# Patient Record
Sex: FEMALE | Race: WHITE | NOT HISPANIC OR LATINO | Employment: UNEMPLOYED
[De-identification: names, ages, dates, MRNs, and addresses within clinical notes are randomized per-mention and may not be internally consistent; named-entity substitution may affect disease eponyms.]

---

## 2022-07-30 ENCOUNTER — TELEPHONE ENCOUNTER (OUTPATIENT)
Age: 72
End: 2022-07-30

## 2022-07-31 ENCOUNTER — TELEPHONE ENCOUNTER (OUTPATIENT)
Age: 72
End: 2022-07-31

## 2023-01-09 ENCOUNTER — OFFICE VISIT (OUTPATIENT)
Dept: URBAN - METROPOLITAN AREA CLINIC 9 | Facility: CLINIC | Age: 73
End: 2023-01-09
Payer: MEDICARE

## 2023-01-09 ENCOUNTER — WEB ENCOUNTER (OUTPATIENT)
Dept: URBAN - METROPOLITAN AREA CLINIC 9 | Facility: CLINIC | Age: 73
End: 2023-01-09

## 2023-01-09 VITALS
DIASTOLIC BLOOD PRESSURE: 80 MMHG | SYSTOLIC BLOOD PRESSURE: 140 MMHG | BODY MASS INDEX: 23.78 KG/M2 | WEIGHT: 148 LBS | HEIGHT: 66 IN

## 2023-01-09 DIAGNOSIS — R19.7 DIARRHEA, UNSPECIFIED TYPE: ICD-10-CM

## 2023-01-09 DIAGNOSIS — R10.84 GENERALIZED ABDOMINAL PAIN: ICD-10-CM

## 2023-01-09 DIAGNOSIS — R19.4 CHANGE IN BOWEL HABITS: ICD-10-CM

## 2023-01-09 DIAGNOSIS — R63.4 WEIGHT LOSS: ICD-10-CM

## 2023-01-09 PROCEDURE — 99244 OFF/OP CNSLTJ NEW/EST MOD 40: CPT | Performed by: STUDENT IN AN ORGANIZED HEALTH CARE EDUCATION/TRAINING PROGRAM

## 2023-01-09 PROCEDURE — 99204 OFFICE O/P NEW MOD 45 MIN: CPT | Performed by: STUDENT IN AN ORGANIZED HEALTH CARE EDUCATION/TRAINING PROGRAM

## 2023-01-09 RX ORDER — ATENOLOL 50 MG/1
TABLET ORAL
Qty: 90 TABLET | Status: ACTIVE | COMMUNITY

## 2023-01-09 RX ORDER — OXYCODONE AND ACETAMINOPHEN 5; 325 MG/1; MG/1
TAKE ONE TABLET BY MOUTH TWICE A DAY TABLET ORAL
Qty: 60 UNSPECIFIED | Refills: 0 | Status: ACTIVE | COMMUNITY

## 2023-01-09 RX ORDER — ROSUVASTATIN CALCIUM 10 MG/1
TABLET, FILM COATED ORAL
Qty: 90 TABLET | Status: ACTIVE | COMMUNITY

## 2023-01-09 NOTE — HPI-TODAY'S VISIT:
Patient has a history of HTN, HLD who presents for loose stool  GI Hx: 10/2021- started having loose stools.  Can have 3-4 BM/day, semiformed. No blood, mucous, oily apperance. Has intermittent perioids without symptoms.  Crampy discomfort when needs to go to BM, improved after defecation. Weight loss 20 lbs- 1 year.  Denies fever, chills, nausea, vomiting. Denies dysphagia, reflux, UGI symptoms. Denies hematemesis, melena, hematochezia, blood per rectum.  Family hx: sister/mother- no diagnosis, but same symptoms  EGD: None  Colonoscopy: 2004- IH, sigmoid divertic, 2mm sigmoid polyp (path: HP), 5mm sigmoid polyp (path: HP), TI normal. Cologuard in 10/2021- normal.  Imaging/Studies/Procedures: 11/28/22- labs- CMP, CBC with Hgb 15.5 (), A1c, TSH 4.8 (H), Vit D 61,

## 2023-01-11 ENCOUNTER — TELEPHONE ENCOUNTER (OUTPATIENT)
Dept: URBAN - METROPOLITAN AREA CLINIC 7 | Facility: CLINIC | Age: 73
End: 2023-01-11

## 2023-01-17 ENCOUNTER — CLAIMS CREATED FROM THE CLAIM WINDOW (OUTPATIENT)
Dept: URBAN - METROPOLITAN AREA CLINIC 4 | Facility: CLINIC | Age: 73
End: 2023-01-17
Payer: MEDICARE

## 2023-01-17 ENCOUNTER — OFFICE VISIT (OUTPATIENT)
Dept: URBAN - METROPOLITAN AREA SURGERY CENTER 9 | Facility: SURGERY CENTER | Age: 73
End: 2023-01-17
Payer: MEDICARE

## 2023-01-17 ENCOUNTER — OFFICE VISIT (OUTPATIENT)
Dept: URBAN - METROPOLITAN AREA SURGERY CENTER 9 | Facility: SURGERY CENTER | Age: 73
End: 2023-01-17

## 2023-01-17 DIAGNOSIS — K63.5 BENIGN COLON POLYP: ICD-10-CM

## 2023-01-17 DIAGNOSIS — K64.0 BLEEDING GRADE I HEMORRHOIDS: ICD-10-CM

## 2023-01-17 DIAGNOSIS — K57.30 ACQUIRED DIVERTICULOSIS OF COLON: ICD-10-CM

## 2023-01-17 DIAGNOSIS — R19.7 ACUTE DIARRHEA: ICD-10-CM

## 2023-01-17 DIAGNOSIS — K52.832 LYMPHOCYTIC COLITIS: ICD-10-CM

## 2023-01-17 PROCEDURE — 88313 SPECIAL STAINS GROUP 2: CPT | Performed by: PATHOLOGY

## 2023-01-17 PROCEDURE — 88305 TISSUE EXAM BY PATHOLOGIST: CPT | Performed by: PATHOLOGY

## 2023-01-17 PROCEDURE — 45385 COLONOSCOPY W/LESION REMOVAL: CPT | Performed by: CLINIC/CENTER

## 2023-01-17 PROCEDURE — 45385 COLONOSCOPY W/LESION REMOVAL: CPT | Performed by: STUDENT IN AN ORGANIZED HEALTH CARE EDUCATION/TRAINING PROGRAM

## 2023-01-17 PROCEDURE — 45380 COLONOSCOPY AND BIOPSY: CPT | Performed by: CLINIC/CENTER

## 2023-01-17 PROCEDURE — 45380 COLONOSCOPY AND BIOPSY: CPT | Performed by: STUDENT IN AN ORGANIZED HEALTH CARE EDUCATION/TRAINING PROGRAM

## 2023-01-17 PROCEDURE — 88342 IMHCHEM/IMCYTCHM 1ST ANTB: CPT | Performed by: PATHOLOGY

## 2023-01-17 RX ORDER — OXYCODONE AND ACETAMINOPHEN 5; 325 MG/1; MG/1
TAKE ONE TABLET BY MOUTH TWICE A DAY TABLET ORAL
Qty: 60 UNSPECIFIED | Refills: 0 | COMMUNITY

## 2023-01-17 RX ORDER — OXYCODONE AND ACETAMINOPHEN 5; 325 MG/1; MG/1
TAKE ONE TABLET BY MOUTH TWICE A DAY TABLET ORAL
Qty: 60 UNSPECIFIED | Refills: 0 | Status: ACTIVE | COMMUNITY

## 2023-01-17 RX ORDER — ATENOLOL 50 MG/1
TABLET ORAL
Qty: 90 TABLET | COMMUNITY

## 2023-01-17 RX ORDER — ROSUVASTATIN CALCIUM 10 MG/1
TABLET, FILM COATED ORAL
Qty: 90 TABLET | Status: ACTIVE | COMMUNITY

## 2023-01-17 RX ORDER — ROSUVASTATIN CALCIUM 10 MG/1
TABLET, FILM COATED ORAL
Qty: 90 TABLET | COMMUNITY

## 2023-01-17 RX ORDER — ATENOLOL 50 MG/1
TABLET ORAL
Qty: 90 TABLET | Status: ACTIVE | COMMUNITY

## 2023-01-30 ENCOUNTER — TELEPHONE ENCOUNTER (OUTPATIENT)
Dept: URBAN - METROPOLITAN AREA CLINIC 7 | Facility: CLINIC | Age: 73
End: 2023-01-30

## 2023-01-30 RX ORDER — BUDESONIDE 3 MG/1
3 CAPS DAILY FOR 4 WEEKS, THEN 2 CAPS DAILY FOR 2 WEEKS, THEN 1 CAP DAILY FOR 2 WEEKS CAPSULE, COATED PELLETS ORAL AS DIRECTED
Qty: 126 | Refills: 0 | OUTPATIENT
Start: 2023-01-30

## 2023-02-06 ENCOUNTER — WEB ENCOUNTER (OUTPATIENT)
Dept: URBAN - METROPOLITAN AREA CLINIC 9 | Facility: CLINIC | Age: 73
End: 2023-02-06

## 2023-02-06 ENCOUNTER — OFFICE VISIT (OUTPATIENT)
Dept: URBAN - METROPOLITAN AREA CLINIC 9 | Facility: CLINIC | Age: 73
End: 2023-02-06
Payer: MEDICARE

## 2023-02-06 VITALS
DIASTOLIC BLOOD PRESSURE: 74 MMHG | BODY MASS INDEX: 23.46 KG/M2 | HEIGHT: 66 IN | WEIGHT: 146 LBS | SYSTOLIC BLOOD PRESSURE: 120 MMHG

## 2023-02-06 DIAGNOSIS — K52.832 LYMPHOCYTIC COLITIS: ICD-10-CM

## 2023-02-06 PROCEDURE — 99213 OFFICE O/P EST LOW 20 MIN: CPT | Performed by: STUDENT IN AN ORGANIZED HEALTH CARE EDUCATION/TRAINING PROGRAM

## 2023-02-06 RX ORDER — ROSUVASTATIN CALCIUM 10 MG/1
TABLET, FILM COATED ORAL
Qty: 90 TABLET | Status: ACTIVE | COMMUNITY

## 2023-02-06 RX ORDER — BUDESONIDE 3 MG/1
3 CAPS DAILY FOR 4 WEEKS, THEN 2 CAPS DAILY FOR 2 WEEKS, THEN 1 CAP DAILY FOR 2 WEEKS CAPSULE, COATED PELLETS ORAL AS DIRECTED
Qty: 126 | Refills: 0 | Status: ON HOLD | COMMUNITY
Start: 2023-01-30

## 2023-02-06 RX ORDER — ROSUVASTATIN CALCIUM 10 MG/1
TABLET, FILM COATED ORAL
OUTPATIENT

## 2023-02-06 RX ORDER — ATENOLOL 50 MG/1
TABLET ORAL
Qty: 90 TABLET | Status: ACTIVE | COMMUNITY

## 2023-02-06 RX ORDER — BUDESONIDE 3 MG/1
3 CAPS DAILY FOR 4 WEEKS, THEN 2 CAPS DAILY FOR 2 WEEKS, THEN 1 CAP DAILY FOR 2 WEEKS CAPSULE, COATED PELLETS ORAL AS DIRECTED
OUTPATIENT
Start: 2023-01-30

## 2023-02-06 RX ORDER — OXYCODONE AND ACETAMINOPHEN 5; 325 MG/1; MG/1
TAKE ONE TABLET BY MOUTH TWICE A DAY TABLET ORAL
Qty: 60 UNSPECIFIED | Refills: 0 | Status: ACTIVE | COMMUNITY

## 2023-02-07 ENCOUNTER — TELEPHONE ENCOUNTER (OUTPATIENT)
Dept: URBAN - METROPOLITAN AREA CLINIC 9 | Facility: CLINIC | Age: 73
End: 2023-02-07

## 2023-02-11 LAB
(TTG) AB, IGA: <1
(TTG) AB, IGG: <1
C-REACTIVE PROTEIN, QUANT: 1.2
CALPROTECTIN, FECAL: <5
CAMPYLOBACTER SPP. AG,EIA: (no result)
CLOSTRIDIUM DIFFICILE: (no result)
CRYPTOSPORIDIUM ANTIGEN,: (no result)
GIARDIA AG, EIA, STOOL: (no result)
GLIADIN (DEAMIDATED) AB (IGA): <1
GLIADIN (DEAMIDATED) AB (IGG): <1
HELICOBACTER PYLORI AG, EIA, STOOL: (no result)
IMMUNOGLOBULIN A: 276
OVA AND PARASITES, CONC AND PERM SMEAR: (no result)
PANCREATIC ELASTASE, FECAL: >500
SALMONELLA AND SHIGELLA, CULTURE: (no result)
SHIGA TOXINS, EIA W/RFL TO E.COLI O157 CULTURE: (no result)

## 2023-03-06 ENCOUNTER — DASHBOARD ENCOUNTERS (OUTPATIENT)
Age: 73
End: 2023-03-06

## 2023-03-06 ENCOUNTER — OFFICE VISIT (OUTPATIENT)
Dept: URBAN - METROPOLITAN AREA CLINIC 9 | Facility: CLINIC | Age: 73
End: 2023-03-06
Payer: MEDICARE

## 2023-03-06 ENCOUNTER — WEB ENCOUNTER (OUTPATIENT)
Dept: URBAN - METROPOLITAN AREA CLINIC 9 | Facility: CLINIC | Age: 73
End: 2023-03-06

## 2023-03-06 VITALS
WEIGHT: 149 LBS | BODY MASS INDEX: 23.95 KG/M2 | DIASTOLIC BLOOD PRESSURE: 70 MMHG | HEIGHT: 66 IN | SYSTOLIC BLOOD PRESSURE: 149 MMHG

## 2023-03-06 DIAGNOSIS — K52.832 LYMPHOCYTIC COLITIS: ICD-10-CM

## 2023-03-06 PROBLEM — 1187071008: Status: ACTIVE | Noted: 2023-01-30

## 2023-03-06 PROCEDURE — 99213 OFFICE O/P EST LOW 20 MIN: CPT | Performed by: STUDENT IN AN ORGANIZED HEALTH CARE EDUCATION/TRAINING PROGRAM

## 2023-03-06 RX ORDER — OXYCODONE AND ACETAMINOPHEN 5; 325 MG/1; MG/1
TAKE ONE TABLET BY MOUTH TWICE A DAY TABLET ORAL
Qty: 60 UNSPECIFIED | Refills: 0 | Status: ACTIVE | COMMUNITY

## 2023-03-06 RX ORDER — BUDESONIDE 3 MG/1
3 CAPS DAILY FOR 4 WEEKS, THEN 2 CAPS DAILY FOR 2 WEEKS, THEN 1 CAP DAILY FOR 2 WEEKS CAPSULE, COATED PELLETS ORAL AS DIRECTED
Status: ACTIVE | COMMUNITY
Start: 2023-01-30

## 2023-03-06 RX ORDER — ATENOLOL 50 MG/1
TABLET ORAL
Qty: 90 TABLET | Status: ACTIVE | COMMUNITY

## 2023-03-06 RX ORDER — BUDESONIDE 3 MG/1
3 CAPS DAILY FOR 4 WEEKS, THEN 2 CAPS DAILY FOR 2 WEEKS, THEN 1 CAP DAILY FOR 2 WEEKS CAPSULE, COATED PELLETS ORAL AS DIRECTED
OUTPATIENT

## 2023-03-06 NOTE — HPI-TODAY'S VISIT:
Patient has a history of HTN, HLD, lymphocytic colitis, who presents for follow up  GI Hx: 10/2021- started having loose stools. Can have 3-4 BM/day, semiformed. No blood, mucous, oily apperance. Has intermittent perioids without symptoms. Crampy discomfort when needs to go to BM, improved after defecation. Weight loss 20 lbs- 1 year. 2/6/23- Still having diarrhea, bloating, loose stools. Unchanged from above. Has not started budesonide due to cost. 3/6/23- Feels much better on budesonide. Formed stools. No diarrhea. Denies fever, chills, nausea, vomiting. Denies dysphagia, reflux, UGI symptoms. Denies hematemesis, melena, hematochezia, blood per rectum.  Family hx: sister/mother- no diagnosis, but same symptoms  EGD: None  Colonoscopy: 2004- IH, sigmoid divertic, 2mm sigmoid polyp (path: HP), 5mm sigmoid polyp (path: HP), TI normal. Cologuard in 10/2021- normal. 1/17/23- TI normal. 5mm polyp in desc colon (bx: TA), sigmoid divertic, IH. R colon, L colon, rectum- all showed lymphocytic colitis. Repeat 5 years.  Imaging/Studies/Procedures: 11/28/22- labs- CMP, CBC with Hgb 15.5 (), A1c, TSH 4.8 (H), Vit D 61, 2/2/23- celiac test, calpro, elastase pending. Infectious stool studies, H pylori neg, CRP normal.